# Patient Record
Sex: FEMALE | Race: WHITE
[De-identification: names, ages, dates, MRNs, and addresses within clinical notes are randomized per-mention and may not be internally consistent; named-entity substitution may affect disease eponyms.]

---

## 2019-08-20 ENCOUNTER — HOSPITAL ENCOUNTER (OUTPATIENT)
Dept: HOSPITAL 53 - M CARPUL | Age: 69
End: 2019-08-20
Attending: NURSE PRACTITIONER
Payer: MEDICARE

## 2019-08-20 DIAGNOSIS — R06.00: Primary | ICD-10-CM

## 2019-08-20 PROCEDURE — 95070 INHLJ BRNCL CHALLENGE TSTG: CPT

## 2019-08-20 PROCEDURE — 94070 EVALUATION OF WHEEZING: CPT

## 2019-08-20 NOTE — PFTRPT
Site: [facility], [address] [place], [place], [address]

ID: [id number]  Name: AVA TELLO

Visit Date: 2019  Second ID: [id number]

Referring Doctor: SAIRA STRONG

Reviewing Doctor: Shoaib Felix MD

Technician: CHARLES PATTERSON RRT

Age: 68  : 1950  Sex: Female  Race: 

Height: 67.00  Inches  Weight: 216.00  Lbs  BSA: 2.09

Order IDs: [id number]

Requested Test(s): <[email or web address]-PFT.METH CHAL>

Diagnosis: R06.00



of albuterol for postbronchodilator.

Review Status: Not Reviewed

 

                                        Pre-Bronch    Post-Bronch

                                 Pred  Actual %Pred  Actual %Chng

SPIROMETRY

FVC (L)                          3.45   3.33     96   3.22     -3 

FEV1 (L)                         2.63   2.73    103   2.61     -4 

FEV1/FVC (%)                       76     82    107     81        

FEF 25% (L/sec)                  5.11   5.33    104   4.42    -17 

FEF 50% (L/sec)                  3.40   3.90    114   3.80     -2 

FEF 75% (L/sec)                  1.03   1.10    106   0.86    -21 

FEF 25-75% (L/sec)               2.18   2.92    133   2.54    -12 

FEF Max (L/sec)                  6.28   5.76     91   4.58    -20 

FIVC (L)                                2.97          2.87     -3 

FIF 50% (L/sec)                  3.66   3.92    107   4.51     15 

FIF Max (L/sec)                         3.92          4.90     24 

Expiratory Time (sec)                   7.12          6.32    -11 

Back Extrap Vol (L)                     0.10          0.09    -12 

Time To FEFmax (sec)                   0.078         0.106     36